# Patient Record
(demographics unavailable — no encounter records)

---

## 2025-02-01 NOTE — HISTORY OF PRESENT ILLNESS
[FreeTextEntry1] : 42 yo male admit 1/18-1/19/25 for L IPH with no significant past medical history who presented to [facility] emergency room for chief complaint of a headache and pain behind his left eye. Patient also endorsed chest pain, and was found to be hypertensive to the 180s systolic.   CT Noncon head demonstrated a left anterior frontal lobe 1 cm x 1 cm x 0.6 cm hyperdense focus concerning for a intraparenchymal hemorrhage. Impression: Headache and eye pain due to L anterior frontal IPH. Etiology underlying cavernous angioma as seen on MRI.  1/28/25: pt arrives for initial HFU, reports intermittent Left eye blurriness and feels associated with elevated BP.  He also reports he is non compliant with BP med due to would like to try lowering his BP with lifestyle changes.  Today /109. Counseled on importance of BP control and encouraged close monitoring and f/u with PCP for medical management safely.  He is agreeable to take BP medication for now and f/u with PCP. Never smoker Mother history stroke  Below copied from hospital course:  MRI BRAIN w/wo contrast 1/18/2025: Cavernous angioma is identified with adjacent branching area of enhancement which could be compatible with a developmental venous anomaly. Tiny area of abnormal susceptibility involving the right perisylvian region as described above.  CT HEAD: Hyperdense focus in the left anterior frontal lobe now measures 1.0 x 1.1 x 0.8 cm (AP x TV x CC) which is slightly increased in size from prior. No significant enhancement is noted. Likely underlying cavernoma.  CTA NECK: No evidence of significant stenosis or occlusion.  CTA HEAD: No large vessel occlusion, significant stenosis or vascular abnormality identified. No aneurysm identified. Tiny aneurysms can be beyond the resolution of CTA technique.  Diagnosis: ICH (intracerebral hemorrhage) Assessment and Plan of Treatment: Please follow up with neurologist in 2 weeks. Continue taking medications as prescribed. Monitor your blood pressure closely.  Diagnosis: Cerebral cavernoma  Care Providers for Follow up (PCP/Outpatient Provider)  Sherry Seo [facility] Ne[facility]adiology [address], [address] [place] [address] Phone: [phone] Fax: [phone] Follow Up Time: 2 weeks  Grant Pedersen Neurosurgery [address], [facility] [address] [place] [address] Phone: [phone] Fax: [phone] Follow Up Time: 2 weeks  Leonora Pierce [facility] [address], [facility] [address] [place] [address] Phone: [phone] Fax: [phone] Follow Up Time: 2 weeks

## 2025-02-01 NOTE — HISTORY OF PRESENT ILLNESS
[FreeTextEntry1] : 42 yo male admit 1/18-1/19/25 for L IPH with no significant past medical history who presented to [facility] emergency room for chief complaint of a headache and pain behind his left eye. Patient also endorsed chest pain, and was found to be hypertensive to the 180s systolic.   CT Noncon head demonstrated a left anterior frontal lobe 1 cm x 1 cm x 0.6 cm hyperdense focus concerning for a intraparenchymal hemorrhage. Impression: Headache and eye pain due to L anterior frontal IPH. Etiology underlying cavernous angioma as seen on MRI.  1/28/25: pt arrives for initial HFU, reports intermittent Left eye blurriness and feels associated with elevated BP.  He also reports he is non compliant with BP med due to would like to try lowering his BP with lifestyle changes.  Today /109. Counseled on importance of BP control and encouraged close monitoring and f/u with PCP for medical management safely.  He is agreeable to take BP medication for now and f/u with PCP. Never smoker Mother history stroke  Below copied from hospital course:  MRI BRAIN w/wo contrast 1/18/2025: Cavernous angioma is identified with adjacent branching area of enhancement which could be compatible with a developmental venous anomaly. Tiny area of abnormal susceptibility involving the right perisylvian region as described above.  CT HEAD: Hyperdense focus in the left anterior frontal lobe now measures 1.0 x 1.1 x 0.8 cm (AP x TV x CC) which is slightly increased in size from prior. No significant enhancement is noted. Likely underlying cavernoma.  CTA NECK: No evidence of significant stenosis or occlusion.  CTA HEAD: No large vessel occlusion, significant stenosis or vascular abnormality identified. No aneurysm identified. Tiny aneurysms can be beyond the resolution of CTA technique.  Diagnosis: ICH (intracerebral hemorrhage) Assessment and Plan of Treatment: Please follow up with neurologist in 2 weeks. Continue taking medications as prescribed. Monitor your blood pressure closely.  Diagnosis: Cerebral cavernoma  Care Providers for Follow up (PCP/Outpatient Provider)  Sherry Seo [facility] Ne[facility]iology [address] [facility] [address], [address] [place] [address] Phone: [phone] Fax: [phone] Follow Up Time: 2 weeks  Grant Pedersen Neurosurgery [address] [facility], [facility] [address] [place] [address] Phone: [phone] Fax: [phone] Follow Up Time: 2 weeks  Leonora Pierce [facility] [address] [facility], [facility] [address] [place] [address] Phone: [phone] Fax: [phone] Follow Up Time: 2 weeks

## 2025-02-01 NOTE — HISTORY OF PRESENT ILLNESS
[FreeTextEntry1] : 42 yo male admit 1/18-1/19/25 for L IPH with no significant past medical history who presented to [facility] emergency room for chief complaint of a headache and pain behind his left eye. Patient also endorsed chest pain, and was found to be hypertensive to the 180s systolic.   CT Noncon head demonstrated a left anterior frontal lobe 1 cm x 1 cm x 0.6 cm hyperdense focus concerning for a intraparenchymal hemorrhage. Impression: Headache and eye pain due to L anterior frontal IPH. Etiology underlying cavernous angioma as seen on MRI.  1/28/25: pt arrives for initial HFU, reports intermittent Left eye blurriness and feels associated with elevated BP.  He also reports he is non compliant with BP med due to would like to try lowering his BP with lifestyle changes.  Today /109. Counseled on importance of BP control and encouraged close monitoring and f/u with PCP for medical management safely.  He is agreeable to take BP medication for now and f/u with PCP. Never smoker Mother history stroke  Below copied from hospital course:  MRI BRAIN w/wo contrast 1/18/2025: Cavernous angioma is identified with adjacent branching area of enhancement which could be compatible with a developmental venous anomaly. Tiny area of abnormal susceptibility involving the right perisylvian region as described above.  CT HEAD: Hyperdense focus in the left anterior frontal lobe now measures 1.0 x 1.1 x 0.8 cm (AP x TV x CC) which is slightly increased in size from prior. No significant enhancement is noted. Likely underlying cavernoma.  CTA NECK: No evidence of significant stenosis or occlusion.  CTA HEAD: No large vessel occlusion, significant stenosis or vascular abnormality identified. No aneurysm identified. Tiny aneurysms can be beyond the resolution of CTA technique.  Diagnosis: ICH (intracerebral hemorrhage) Assessment and Plan of Treatment: Please follow up with neurologist in 2 weeks. Continue taking medications as prescribed. Monitor your blood pressure closely.  Diagnosis: Cerebral cavernoma  Care Providers for Follow up (PCP/Outpatient Provider)  Sherry Seo [facility] Ne[facility]adiology [address], [address] [place] [address] Phone: [phone] Fax: [phone] Follow Up Time: 2 weeks  Grant Pedersen Neurosurgery [address], [facility] [address] [place] [address] Phone: [phone] Fax: [phone] Follow Up Time: 2 weeks  Leonora Pierce [facility] [address], [facility] [address] [place] [address] Phone: [phone] Fax: [phone] Follow Up Time: 2 weeks

## 2025-02-01 NOTE — ASSESSMENT
[FreeTextEntry1] : Impression: Headache and eye pain due to L anterior frontal IPH. Etiology underlying cavernous angioma as seen on MRI. 1/28/25: pt arrives for initial HFU, non compliant with BP med due to would like to try lowering his BP with lifestyle changes.  Today /109.  A1C 5.3%  Plan: Counseled on importance of BP control and encouraged close monitoring and f/u with PCP for medical management safely.  He is agreeable to take BP medication for now and f/u with PCP.  MRI Brain w/wo  1 year RTO 1 year f/u Neurosurgeon - Dr. Noah Murillo for cavernoma

## 2025-02-01 NOTE — END OF VISIT
[FreeTextEntry3] :  I have seen and evaluated patient with NP Chasity Roberts who has completed the documentation above.  The patient was admitted to the hospital after developing blurry vision in the left eye, which has since resolved. Upon evaluating the CT scan of the head and MRI, I do not believe the cavernoma bled acutely, as there are no signs of acute bleeding. The patient is neurologically intact at this moment.  Advice on Cavernomas:  Cavernomas, also known as cerebral cavernous malformations, are clusters of abnormal blood vessels typically found in the brain and spinal cord. While most cavernomas do not cause symptoms, they can lead to seizures, especially if they are located in the supratentorial region (upper part of the brain). The estimated yearly risk of bleeding for supratentorial cavernomas is approximately 1 in 100.  Indications for Surgery:  Surgery may be considered if the cavernoma causes ongoing or worsening symptoms, recurrent hemorrhages, or uncontrolled seizures. In cases where the cavernoma is accessible and the patient is a suitable candidate, surgical resection can be performed to alleviate symptoms and reduce the risk of future bleeding. At this moment I do not think surgery is indicated and I will advise towards close monitoring with serial neuroimaging. I will have patient evaluated by Neurosurgeon with vascular subspecialty as well.

## 2025-02-04 NOTE — HISTORY OF PRESENT ILLNESS
[FreeTextEntry8] : 42 yo M with recent hospitalization for intracranial hemorrhage. Pt also found to be hypertensive at the time. He was prescribed amlodipine. Took it once and didn't take it again. Has seen neuro-- had repeat CT scan-- stable. Has another f/u on Thursday.   Pt does not smoke/drink/drugs. Pt did indulge over the holidays but normally does not have high cholesterol. . Mom with hx recent CVA. a1c 5.3 during hospital visit.

## 2025-02-06 NOTE — PHYSICAL EXAM
[General Appearance - Alert] : alert [General Appearance - In No Acute Distress] : in no acute distress [Person] : oriented to person [Place] : oriented to place [Time] : oriented to time [Cranial Nerves Oculomotor (III)] : extraocular motion intact [Cranial Nerves Trigeminal (V)] : facial sensation intact symmetrically [Cranial Nerves Facial (VII)] : face symmetrical [Cranial Nerves Vestibulocochlear (VIII)] : hearing was intact bilaterally [Cranial Nerves Accessory (XI - Cranial And Spinal)] : head turning and shoulder shrug symmetric [Cranial Nerves Hypoglossal (XII)] : there was no tongue deviation with protrusion [Motor Strength] : muscle strength was normal in all four extremities [Involuntary Movements] : no involuntary movements were seen [Sensation Tactile Decrease] : light touch was intact [] : no respiratory distress [Abnormal Walk] : normal gait

## 2025-02-06 NOTE — HISTORY OF PRESENT ILLNESS
[de-identified] : Severino is a 41yr old male here for a new patient visit. He has no past medical history.  He presented to [facility] ER 1/18/2025 with chief complaint of a headache and pain behind his left eye. Patient also endorsed chest pain, and was found to be hypertensive to the 180s systolic. CT Non con head demonstrated  a left anterior frontal lobe 1 cm x 1 cm x 0.6 cm hyperdense focus concerning for a intraparenchymal hemorrhage. Patient reported that on Monday 1/13 he began to have a headache and has had a headache every day since. The headache was gradual in onset and localized to the left side of his head. He also feels like there is something in his eye that is pushing his eye forward. He followed up with neurologist Dr. Declan King last week. On the mri brain there was an incidental finding of a cerebral cavernoma which he is here to discuss today with us. Today he feels well chief complaint of dull headache on the left side of his head.

## 2025-02-06 NOTE — ASSESSMENT
[FreeTextEntry1] : Impression: 41yr old male no past medical history presented to [facility] ER 1/18/2025 with chief complaint of a headache and pain behind his left eye.  Patient reported that on Monday 1/13 he began to have a headache and has had a headache every day since. The headache was gradual in onset and localized to the left side of his head. He also feels like there is something in his eye that is pushing his eye forward.   MRI brain 1/18/2025 - There is evidence of an abnormal lesion seen which corresponds to high attenuation lesion seen in prior head CT. This lesion is seen in the medial aspect of the left frontal subcortical region. This lesion does demonstrate a hemosiderin rim on axial T2 FLAIR sequence as well as abnormal susceptibility. This finding is suspicious for an underlying cavernous angioma. This lesion measures approximately 1.0 x 1.0 cm. There are tiny branching areas of adjacent enhancement which could be compatible with a adjacent developmental venous anomaly.  Patient presents today neurologically intact. Chief complaint of dull headache.   Plan: Discussed with patient this is not the cause of his headaches- there is no evidence of surrounding edema of the cavernoma on the mri brain to suggest recent hemorrhage  Discussed with patient natural history of cerebral cavernomas  Discussed risk of bleeding from this lesion is very low  Recommend conservative management with repeat mri brain w.napoleonc contrast in 1 year

## 2025-02-06 NOTE — RESULTS/DATA
[FreeTextEntry1] : ACC: [id number] EXAM: MR BRAIN WAW IC ORDERED BY: EREN ARELLANO  PROCEDURE DATE: 01/18/2025    INTERPRETATION: Clinical indication: Lesion.  MRI of the brain was performed sagittal T1 axial T1 and T2 T2 FLAIR diffusion and susceptibility sequence. Coronal T2-weighted sequence was performed as well. The patient was injected with approximately 9.5 cc of gadavist IV with 0.5 cc of contrast discarded. Sagittal coronal and axial T1-weighted sequence was performed  This exam is compared prior head CT and CTA performed on January 18, 2025.  The lateral ventricles have a normal configuration  There is evidence of an abnormal lesion seen which corresponds to high attenuation lesion seen in prior head CT. This lesion is seen in the medial aspect of the left frontal subcortical region. This lesion does demonstrate a hemosiderin rim on axial T2 FLAIR sequence as well as abnormal susceptibility. This finding is suspicious for an underlying cavernous angioma. This lesion measures approximately 1.0 x 1.0 cm. There are tiny branching areas of adjacent enhancement which could be compatible with a adjacent developmental venous anomaly.  Tiny area of abnormal susceptibility is seen involving the right perisylvian region (11-40) this could be compatible area of old hemorrhage small cavernous angioma or area of mineralization.  Linear area of T2 prolongation involving the right frontal subcortical region is seen. This nonspecific finding could be related to migraine headaches though other possibilities include areas of infection inflammation ischemia or demyelinating disease  No significant shift or herniation is seen.  The large vessels demonstrate normal flow voids  Minimal mucosal thickening is seen involving. The left maxillary sinus. Right maxillary polyp versus retention cyst is seen  Both mastoid and middle ear regions appear clear.  IMPRESSION: Cavernous angioma is identified with adjacent branching area of enhancement which could be compatible with a developmental venous anomaly.  Tiny area of abnormal susceptibility involving the right perisylvian region as described above.  --- End of Report ---      YASMIN ARVIZU MD; Attending Radiologist

## 2025-02-06 NOTE — REASON FOR VISIT
[New Patient Visit] : a new patient visit [Referred By: _________] : Patient was referred by LINDSAY

## 2025-03-04 NOTE — PHYSICAL EXAM
[Normal] : affect was normal and insight and judgment were intact [de-identified] : tiny subconjunctival hemorrhage left eye

## 2025-03-04 NOTE — HISTORY OF PRESENT ILLNESS
[FreeTextEntry1] : BP f/u [de-identified] : 40 yo M with hx cavernoma, HTN presents for BP f/u. BP was noted to be elevated previously. Since last visit, has been fasting for the last 2 weeks (no food intake for 2 weeks). Lost 15 lbs. BPs at home were 110s-120s/70s. He normally does these fasts usually for 1 week to lose weight. Generally, pt diet pretty good. He did indulge during the holidays and gained significant weight. He has not taken the amlodipine at all. Has not had headaches since last visit. LDL in hospital was 155.   Saw Neuro[facility] regarding cavernoma. Repeat MRI next year. No intervention at this time.  He didn't sleep last night due eye lash in his eye. He feels a bit soreness from rubbing his eyes. Does not wear contacts.

## 2025-04-08 NOTE — HISTORY OF PRESENT ILLNESS
[FreeTextEntry1] : annual [de-identified] : 40 yo M with HLD, hx intraparenchymal hemorrhage, HTN presents for annual.  Pt reports he fell off the wagon again-- BP up. Home SBPs 160s-170s.  Pt is vegan-- last . Mom with stroke, dad passed from CAD.  Not exercising  Declines vaccines  Pt reports decreased libido, no morning erections for awhile now. Sexually active with 1 partner.

## 2025-04-08 NOTE — HISTORY OF PRESENT ILLNESS
[FreeTextEntry1] : annual [de-identified] : 40 yo M with HLD, hx intraparenchymal hemorrhage, HTN presents for annual.  Pt reports he fell off the wagon again-- BP up. Home SBPs 160s-170s.  Pt is vegan-- last . Mom with stroke, dad passed from CAD.  Not exercising  Declines vaccines  Pt reports decreased libido, no morning erections for awhile now. Sexually active with 1 partner.

## 2025-04-08 NOTE — HEALTH RISK ASSESSMENT
[de-identified] : running [de-identified] : vegan [Reports changes in hearing] : Reports no changes in hearing [Reports changes in vision] : Reports no changes in vision [Reports changes in dental health] : Reports no changes in dental health

## 2025-04-08 NOTE — HEALTH RISK ASSESSMENT
[de-identified] : running [de-identified] : vegan [Reports changes in hearing] : Reports no changes in hearing [Reports changes in vision] : Reports no changes in vision [Reports changes in dental health] : Reports no changes in dental health

## 2025-05-19 NOTE — HISTORY OF PRESENT ILLNESS
[FreeTextEntry1] : BP f/u, ED [de-identified] : 42 yo M with HTN, HLD presents for BP f/u.  Pt managed to bring down his BP.  HLD-- + LpA. Suggest statin but pt wants to hold off first. He will look into it. Advised he is high risk for CVD given family history and + LpA.  +ED. Pt also reports loss of morning erection. Still has libido.

## 2025-05-22 NOTE — BEGINNING OF VISIT
[0] : 2) Feeling down, depressed, or hopeless: Not at all (0) [PHQ-9 Negative] : PHQ-9 Negative [Advised Primary Care Follow-up] : Advised Primary Care Follow-up  [ROG4Lkoad] : 0 [Never] : Never [Reviewed, no changes] : Reviewed, no changes [Abdominal Pain] : no abdominal pain [Vomiting] : no vomiting [Constipation] : no constipation

## 2025-05-22 NOTE — PHYSICAL EXAM
[Fully active, able to carry on all pre-disease performance without restriction] : Status 0 - Fully active, able to carry on all pre-disease performance without restriction [Normal] : affect appropriate [de-identified] : no palable liver or spleen [de-identified] : no palpable cervical axillary suproclavicular  lymphadenopathy

## 2025-05-22 NOTE — HISTORY OF PRESENT ILLNESS
[Date: ____________] : Patient's last distress assessment performed on [unfilled]. [0 - No Distress] : Distress Level: 0 [100: Normal, no complaints, no evidence of disease.] : 100: Normal, no complaints, no evidence of disease. [ECOG Performance Status: 0 - Fully active, able to carry on all pre-disease performance without restriction] : Performance Status: 0 - Fully active, able to carry on all pre-disease performance without restriction [de-identified] : Severino Hernandez is a 41-year-old male referred for evaluation of a low white blood cell count and neutropenia. Note in results section: neutrophil count resulted at 540. He is not on medication to result in lowering of the neutrophil count.  "I am fine.." Past medical history of"... bleeding in the brain..."; he thinks it may have been 20 years ago; He has seen a neurosurgeon Dr King for follow up of a cavernoma; as a child; he is not on medication. Admission in past was at [facility]. He has a history of hypertension listed as a medical diagnosis; however, he states tht the blood pressure is controlled and that he does not need medication. BP today 124/88 with normal pulse and respirations. [FreeTextEntry1] : first visit [de-identified] : first visit

## 2025-05-22 NOTE — ASSESSMENT
[With Patient/Caregiver] : With Patient/Caregiver [Designated Health Care Proxy] : Designated Health Care Proxy [Name: ___] : Name: [unfilled] [Supportive] : Goals of care discussed with patient: Supportive [Palliative Care Plan] : not applicable at this time [FreeTextEntry1] : Severino Petit is a 41-year-old male seen for evaluation of a neutropenia. He has no prior CBC in his 5 year record at [facility] and today's blood testing shows and improvement over the April reading. The physical examination is normal, and he dies not have a history of chronic infectious disorder. He has no history of cancer, cancer chemotherapy or exposure (known) to toxins.  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  Testing requested today: review of peripheral blood smear: it is normal US of abdomen to assess liver and the spleen. vitamin B 12 and folate level peripheral blood flow cytometry. RTC in 6 weeks with follow up by Geo Casey. [AdvancecareDate] : 05/22/2025

## 2025-05-22 NOTE — HISTORY OF PRESENT ILLNESS
[Date: ____________] : Patient's last distress assessment performed on [unfilled]. [0 - No Distress] : Distress Level: 0 [100: Normal, no complaints, no evidence of disease.] : 100: Normal, no complaints, no evidence of disease. [ECOG Performance Status: 0 - Fully active, able to carry on all pre-disease performance without restriction] : Performance Status: 0 - Fully active, able to carry on all pre-disease performance without restriction [de-identified] : Severino Hernandez is a 41-year-old male referred for evaluation of a low white blood cell count and neutropenia. Note in results section: neutrophil count resulted at 540. He is not on medication to result in lowering of the neutrophil count.  "I am fine.." Past medical history of"... bleeding in the brain..."; he thinks it may have been 20 years ago; He has seen a neurosurgeon Dr King for follow up of a cavernoma; as a child; he is not on medication. Admission in past was at [facility]. He has a history of hypertension listed as a medical diagnosis; however, he states tht the blood pressure is controlled and that he does not need medication. BP today 124/88 with normal pulse and respirations. [FreeTextEntry1] : first visit [de-identified] : first visit

## 2025-05-22 NOTE — REVIEW OF SYSTEMS
[Diarrhea: Grade 0] : Diarrhea: Grade 0 [Negative] : Allergic/Immunologic [Joint Pain] : joint pain [FreeTextEntry9] : knees,back

## 2025-05-22 NOTE — ASSESSMENT
[With Patient/Caregiver] : With Patient/Caregiver [Designated Health Care Proxy] : Designated Health Care Proxy [Name: ___] : Name: [unfilled] [Supportive] : Goals of care discussed with patient: Supportive [Palliative Care Plan] : not applicable at this time [FreeTextEntry1] : Severino Petit is a 41-year-old male seen for evaluation of a neutropenia. He has no prior CBC in his 5 year record at [facility] and today's blood testing shows and improvement over the April reading. The physical examination is normal, and he dies not have a history of chronic infectious disorder. He has no history of cancer, cancer chemotherapy or exposure (known) to toxins.  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  Testing requested today: review of peripheral blood smear: it is normal US of abdomen to assess liver and the spleen. vitamin B 12 and folate level peripheral blood flow cytometry. RTC in 6 weeks with follow up by Geo Casey. [AdvancecareDate] : 05/22/2025

## 2025-05-22 NOTE — PHYSICAL EXAM
[Fully active, able to carry on all pre-disease performance without restriction] : Status 0 - Fully active, able to carry on all pre-disease performance without restriction [Normal] : affect appropriate [de-identified] : no palable liver or spleen [de-identified] : no palpable cervical axillary suproclavicular  lymphadenopathy

## 2025-05-22 NOTE — BEGINNING OF VISIT
[0] : 2) Feeling down, depressed, or hopeless: Not at all (0) [PHQ-9 Negative] : PHQ-9 Negative [Advised Primary Care Follow-up] : Advised Primary Care Follow-up  [[id number]] : 0 [Never] : Never [Reviewed, no changes] : Reviewed, no changes [Abdominal Pain] : no abdominal pain [Vomiting] : no vomiting [Constipation] : no constipation

## 2025-05-22 NOTE — RESULTS/DATA
[FreeTextEntry1] : 04/08/2025 CBC WBC 2.05 HGB 15.8g/dL  000 neutrophil 0.54. percentage 26% 05/22/2025 CBC WBC 2.79 HGB 15.5g/dL MCV 84  000 neutrophil count 1.07 neutrophil percentage 38 %

## 2025-07-02 NOTE — REVIEW OF SYSTEMS
[Diarrhea: Grade 0] : Diarrhea: Grade 0 [Joint Pain] : joint pain [Negative] : Allergic/Immunologic [FreeTextEntry9] : knees,back

## 2025-07-02 NOTE — PHYSICAL EXAM
[Fully active, able to carry on all pre-disease performance without restriction] : Status 0 - Fully active, able to carry on all pre-disease performance without restriction [Normal] : affect appropriate [de-identified] : no palable liver or spleen [de-identified] : no palpable cervical axillary suproclavicular  lymphadenopathy

## 2025-07-02 NOTE — HISTORY OF PRESENT ILLNESS
[Date: ____________] : Patient's last distress assessment performed on [unfilled]. [0 - No Distress] : Distress Level: 0 [100: Normal, no complaints, no evidence of disease.] : 100: Normal, no complaints, no evidence of disease. [ECOG Performance Status: 0 - Fully active, able to carry on all pre-disease performance without restriction] : Performance Status: 0 - Fully active, able to carry on all pre-disease performance without restriction [de-identified] : Severino Hernandez is a 41-year-old male referred for evaluation of a low white blood cell count and neutropenia. Note in results section: neutrophil count resulted at 540. He is not on medication to result in lowering of the neutrophil count.  "I am fine.." Past medical history of"... bleeding in the brain..."; he thinks it may have been 20 years ago; He has seen a neurosurgeon Dr King for follow up of a cavernoma; as a child; he is not on medication. Admission in past was at [facility]. He has a history of hypertension listed as a medical diagnosis; however, he states tht the blood pressure is controlled and that he does not need medication. BP today 124/88 with normal pulse and respirations. [FreeTextEntry1] : first visit [de-identified] : Mr. 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 He returns for a follow-up visit with a diagnosis of neutropenia, presenting with a white blood cell count of 2.26 and an absolute neutrophil count (ANC) of 0.56, alongside a normal peripheral smear. During today's visit, Mr. Hernandez reports experiencing a throbbing sensation on the left side of his head after eating a meal. He states that he feels well overall and denies any symptoms such as fever, chills, malaise, night sweats, lymphadenopathy, or abdominal pain. He also reports no recent hospitalizations or illnesses. His diet consists mainly of vegetables, pasta, fruits, and lentils. He does not consume seafood or red meat. Additionally, he takes multiple herbal supplements and has promised to email me a complete list of these. Mr. Hernandez drinks high-sodium mineral water. His blood pressure was elevated at 142/94, and he admitted that he does not take his prescribed hypertension medication, as he prefers to use herbal remedies. A repeat blood pressure reading at the end of the visit was 128/84.

## 2025-07-02 NOTE — HISTORY OF PRESENT ILLNESS
[Date: ____________] : Patient's last distress assessment performed on [unfilled]. [0 - No Distress] : Distress Level: 0 [100: Normal, no complaints, no evidence of disease.] : 100: Normal, no complaints, no evidence of disease. [ECOG Performance Status: 0 - Fully active, able to carry on all pre-disease performance without restriction] : Performance Status: 0 - Fully active, able to carry on all pre-disease performance without restriction [de-identified] : Severino Hernandez is a 41-year-old male referred for evaluation of a low white blood cell count and neutropenia. Note in results section: neutrophil count resulted at 540. He is not on medication to result in lowering of the neutrophil count.  "I am fine.." Past medical history of"... bleeding in the brain..."; he thinks it may have been 20 years ago; He has seen a neurosurgeon Dr King for follow up of a cavernoma; as a child; he is not on medication. Admission in past was at [facility]. He has a history of hypertension listed as a medical diagnosis; however, he states tht the blood pressure is controlled and that he does not need medication. BP today 124/88 with normal pulse and respirations. [FreeTextEntry1] : first visit [de-identified] : Mr. 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 He returns for a follow-up visit with a diagnosis of neutropenia, presenting with a white blood cell count of 2.26 and an absolute neutrophil count (ANC) of 0.56, alongside a normal peripheral smear. During today's visit, Mr. Hernandez reports experiencing a throbbing sensation on the left side of his head after eating a meal. He states that he feels well overall and denies any symptoms such as fever, chills, malaise, night sweats, lymphadenopathy, or abdominal pain. He also reports no recent hospitalizations or illnesses. His diet consists mainly of vegetables, pasta, fruits, and lentils. He does not consume seafood or red meat. Additionally, he takes multiple herbal supplements and has promised to email me a complete list of these. Mr. Hernandze drinks high-sodium mineral water. His blood pressure was elevated at 142/94, and he admitted that he does not take his prescribed hypertension medication, as he prefers to use herbal remedies. A repeat blood pressure reading at the end of the visit was 128/84.

## 2025-07-02 NOTE — ASSESSMENT
[Supportive] : Goals of care discussed with patient: Supportive [Palliative Care Plan] : not applicable at this time [FreeTextEntry1] : Severino Petit is a 41-year-old male seen for evaluation of a neutropenia. He has no prior CBC in his 5 year record at [facility] and today's blood testing shows and improvement over the April reading. The physical examination is normal, and he dies not have a history of chronic infectious disorder. He has no history of cancer, cancer chemotherapy or exposure (known) to toxins.  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  Testing requested today: review of peripheral blood smear: it is normal US of abdomen to assess liver and the spleen. vitamin B 12 and folate level peripheral blood flow cytometry. RTC in 6 weeks with follow up by Geo Casey.  July 2, 2025 Mr. 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 He returns for a follow-up visit with a diagnosis of neutropenia, presenting with a white blood cell count of 2.26 and an absolute neutrophil count (ANC) of 0.56, alongside a normal peripheral blood smear, B12 and folate levels.  LABS WBC: 2.22K/uL HGB 15.8 MCV 88.8fl HCT 43.8  Diff Neutro% 25.1 ANC 0.56 Lymphocytes 1.32 Printed copy presented to the pt  Plan -Benign Neutropenia- Continue surveillance -Left frontal Cavernoma- Follow up with Neurosurgery for annual surveillance -Follow up with PCP for Blood pressure management; Begin taking medications as prescribed -Email list of herbal remedies or bring next visit. -Plan of care and collaboration discussed with Dr. Manny Zpaata -Follow up in 3 months. He will need CBC w/ diff and CMP Education was provided regarding the plan of care. The shared decision-making protocol was implemented, and multiple questions were answered to the patient's satisfaction. During the visit, I performed a comprehensive history and physical examination, including review of medications.

## 2025-07-02 NOTE — ASSESSMENT
[Supportive] : Goals of care discussed with patient: Supportive [Palliative Care Plan] : not applicable at this time [FreeTextEntry1] : Severino Petit is a 41-year-old male seen for evaluation of a neutropenia. He has no prior CBC in his 5 year record at [facility] and today's blood testing shows and improvement over the April reading. The physical examination is normal, and he dies not have a history of chronic infectious disorder. He has no history of cancer, cancer chemotherapy or exposure (known) to toxins.  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  Testing requested today: review of peripheral blood smear: it is normal US of abdomen to assess liver and the spleen. vitamin B 12 and folate level peripheral blood flow cytometry. RTC in 6 weeks with follow up by Geo Casey.  July 2, 2025 Mr. 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 He returns for a follow-up visit with a diagnosis of neutropenia, presenting with a white blood cell count of 2.26 and an absolute neutrophil count (ANC) of 0.56, alongside a normal peripheral blood smear, B12 and folate levels.  LABS WBC: 2.22K/uL HGB 15.8 MCV 88.8fl HCT 43.8  Diff Neutro% 25.1 ANC 0.56 Lymphocytes 1.32 Printed copy presented to the pt  Plan -Benign Neutropenia- Continue surveillance -Left frontal Cavernoma- Follow up with Neurosurgery for annual surveillance -Follow up with PCP for Blood pressure management; Begin taking medications as prescribed -Email list of herbal remedies or bring next visit. -Plan of care and collaboration discussed with Dr. Manny Zapata -Follow up in 3 months. He will need CBC w/ diff and CMP Education was provided regarding the plan of care. The shared decision-making protocol was implemented, and multiple questions were answered to the patient's satisfaction. During the visit, I performed a comprehensive history and physical examination, including review of medications.

## 2025-07-02 NOTE — PHYSICAL EXAM
[Fully active, able to carry on all pre-disease performance without restriction] : Status 0 - Fully active, able to carry on all pre-disease performance without restriction [Normal] : affect appropriate [de-identified] : no palable liver or spleen [de-identified] : no palpable cervical axillary suproclavicular  lymphadenopathy